# Patient Record
Sex: MALE | Race: WHITE | Employment: UNEMPLOYED | URBAN - METROPOLITAN AREA
[De-identification: names, ages, dates, MRNs, and addresses within clinical notes are randomized per-mention and may not be internally consistent; named-entity substitution may affect disease eponyms.]

---

## 2017-10-08 ENCOUNTER — HOSPITAL ENCOUNTER (EMERGENCY)
Age: 32
Discharge: HOME | End: 2017-10-09
Attending: EMERGENCY MEDICINE

## 2017-10-08 ENCOUNTER — APPOINTMENT (OUTPATIENT)
Dept: GENERAL RADIOLOGY | Age: 32
End: 2017-10-08

## 2017-10-08 DIAGNOSIS — S61.219A LACERATION OF FINGER, INITIAL ENCOUNTER: Primary | ICD-10-CM

## 2017-10-08 PROCEDURE — 99283 EMERGENCY DEPT VISIT LOW MDM: CPT

## 2017-10-08 PROCEDURE — 90471 IMMUNIZATION ADMIN: CPT | Performed by: EMERGENCY MEDICINE

## 2017-10-08 PROCEDURE — 73140 X-RAY EXAM OF FINGER(S): CPT

## 2017-10-08 PROCEDURE — 6360000002 HC RX W HCPCS: Performed by: EMERGENCY MEDICINE

## 2017-10-08 PROCEDURE — 2500000003 HC RX 250 WO HCPCS: Performed by: EMERGENCY MEDICINE

## 2017-10-08 PROCEDURE — 12001 RPR S/N/AX/GEN/TRNK 2.5CM/<: CPT

## 2017-10-08 PROCEDURE — 90715 TDAP VACCINE 7 YRS/> IM: CPT | Performed by: EMERGENCY MEDICINE

## 2017-10-08 RX ORDER — LIDOCAINE HYDROCHLORIDE 10 MG/ML
20 INJECTION, SOLUTION INFILTRATION; PERINEURAL ONCE
Status: COMPLETED | OUTPATIENT
Start: 2017-10-08 | End: 2017-10-08

## 2017-10-08 RX ADMIN — TETANUS TOXOID, REDUCED DIPHTHERIA TOXOID AND ACELLULAR PERTUSSIS VACCINE, ADSORBED 0.5 ML: 5; 2.5; 8; 8; 2.5 SUSPENSION INTRAMUSCULAR at 23:48

## 2017-10-08 RX ADMIN — LIDOCAINE HYDROCHLORIDE 20 ML: 10 INJECTION, SOLUTION INFILTRATION; PERINEURAL at 23:49

## 2017-10-08 ASSESSMENT — PAIN DESCRIPTION - ORIENTATION: ORIENTATION: LEFT

## 2017-10-08 ASSESSMENT — PAIN DESCRIPTION - LOCATION: LOCATION: FINGER (COMMENT WHICH ONE)

## 2017-10-08 ASSESSMENT — PAIN DESCRIPTION - PAIN TYPE: TYPE: ACUTE PAIN

## 2017-10-08 ASSESSMENT — PAIN SCALES - GENERAL: PAINLEVEL_OUTOF10: 2

## 2017-10-09 VITALS
WEIGHT: 135 LBS | SYSTOLIC BLOOD PRESSURE: 119 MMHG | RESPIRATION RATE: 12 BRPM | TEMPERATURE: 98.1 F | HEART RATE: 82 BPM | OXYGEN SATURATION: 95 % | DIASTOLIC BLOOD PRESSURE: 79 MMHG

## 2017-10-09 ASSESSMENT — ENCOUNTER SYMPTOMS
SHORTNESS OF BREATH: 0
COUGH: 0
ABDOMINAL PAIN: 0
NAUSEA: 0
SORE THROAT: 0
VOMITING: 0

## 2017-10-09 NOTE — ED PROVIDER NOTES
call the primary care provider listed on their discharge instructions or a physician of their choice this week to arrange follow up for further evaluation of possible pre-hypertension or Hypertension. (Please note that portions of this note were completed with a voice recognition program.  Efforts were made to edit the dictations but occasionally words are mis-transcribed. )    Robby Calabrese MD  Attending Emergency Medicine Physician              Venecia Esparza MD  10/08/17 0272

## 2017-10-09 NOTE — ED PROVIDER NOTES
FACULTY SIGN-OUT  ADDENDUM     Care of this patient was assumed from Dr Yonas Cohen. The patient was seen for Laceration (laceration to left third digit, pt. was cleaning tile and knife slipped.  )  . The patient's initial evaluation and plan have been discussed with the prior provider who initially evaluated the patient. Nursing Notes, Past Medical Hx, Past Surgical Hx, Social Hx, Allergies, and Family Hx were all reviewed. ED COURSE      The patient was given the following medications:  Orders Placed This Encounter   Medications    Tetanus-Diphth-Acell Pertussis (BOOSTRIX) injection 0.5 mL    lidocaine 1 % injection 20 mL       RECENT VITALS:   Temp: 98.1 °F (36.7 °C), Pulse: 82,  , BP: 119/79    MEDICAL DECISION MAKING       Pt with lac. Sutured. D/c.       Harriet Gallo MD  Emergency Medicine Attending       Lela Bunn MD  10/09/17 0158

## 2017-10-09 NOTE — ED PROVIDER NOTES
[facility]  ED  Emergency Department Encounter  Emergency Medicine Resident     Pt Name: Breanna Cobos  MRN: [id number]  [place] 1985  Date of evaluation: 10/9/17  PCP:  No primary care provider on file. CHIEF COMPLAINT       Chief Complaint   Patient presents with    Laceration     laceration to left third digit, pt. was cleaning tile and knife slipped. HISTORY OF PRESENT ILLNESS  (Location/Symptom, Timing/Onset, Context/Setting, Quality, Duration, Modifying Factors, Severity.)      Breanna Cobos is a 27 y.o. male who presents with Laceration to the medial aspect of his left middle finger. States that about 3:00 this afternoon, he cut his finger with a knife while fixing tile. States that he presents at this hour due to uncontrolled bleeding. Subsequently any blood thinners. Cannot recall when his last tetanus shot was administered. He is right-hand dominant. Denies any fever, chills, nausea or vomiting. Denies any other constitutional symptoms. Denies any weakness, numbness or tingling to his upper or lower extremities. PAST MEDICAL / SURGICAL / SOCIAL / FAMILY HISTORY      has no past medical history on file. has no past surgical history on file. Social History     Social History    Marital status: Single     Spouse name: N/A    Number of children: N/A    Years of education: N/A     Occupational History    Not on file. Social History Main Topics    Smoking status: Current Every Day Smoker     Packs/day: 1.00     Types: Cigarettes    Smokeless tobacco: Never Used    Alcohol use No    Drug use:      Types: Marijuana    Sexual activity: Not on file     Other Topics Concern    Not on file     Social History Narrative    No narrative on file       History reviewed. No pertinent family history. Allergies:  Review of patient's allergies indicates no known allergies.     Home Medications:  Prior to Admission medications    Not on File REVIEW OF SYSTEMS    (2-9 systems for level 4, 10 or more for level 5)      Review of Systems   Constitutional: Negative for chills and fever. HENT: Negative for sore throat. Eyes: Negative for visual disturbance. Respiratory: Negative for cough and shortness of breath. Cardiovascular: Negative for chest pain. Gastrointestinal: Negative for abdominal pain, nausea and vomiting. Genitourinary: Negative for difficulty urinating. Musculoskeletal: Negative for arthralgias and myalgias. Skin: Positive for wound. Neurological: Negative for weakness, numbness and headaches. Psychiatric/Behavioral: Negative for behavioral problems. PHYSICAL EXAM   (up to 7 for level 4, 8 or more for level 5)      INITIAL VITALS:   /79   Pulse 82   Temp 98.1 °F (36.7 °C) (Oral)   Resp 12   Wt 135 lb (61.2 kg)   SpO2 95%     Physical Exam   Constitutional: He is oriented to person, place, and time. He appears well-developed and well-nourished. No distress. HENT:   Head: Normocephalic and atraumatic. Eyes: EOM are normal. Pupils are equal, round, and reactive to light. Neck: Normal range of motion. Musculoskeletal: Normal range of motion. Neurological: He is alert and oriented to person, place, and time. He has normal strength. No sensory deficit. GCS eye subscore is 4. GCS verbal subscore is 5. GCS motor subscore is 6. Skin:   1.5 cm linear laceration to medial aspect of left middle finger along the length of the DIP   Psychiatric: His behavior is normal.       DIFFERENTIAL  DIAGNOSIS     PLAN (LABS / IMAGING / EKG):  Orders Placed This Encounter   Procedures    XR FINGER LEFT (MIN 2 VIEWS)       MEDICATIONS ORDERED:  Orders Placed This Encounter   Medications    Tetanus-Diphth-Acell Pertussis (BOOSTRIX) injection 0.5 mL    lidocaine 1 % injection 20 mL       DIAGNOSTIC RESULTS / EMERGENCY DEPARTMENT COURSE / MDM     LABS:  No results found for this visit on 10/08/17.     IMPRESSION: 1.5 cm laceration along the medial aspect of the left middle finger. Patient afebrile and hemodynamically stable. He is healthy and nontoxic in appearance. Given the injury, we'll plan to irrigate, obtain a plain film, administer some anesthetic and suture the laceration. We'll also update his tetanus. RADIOLOGY:  Xr Finger Left (min 2 Views)    Result Date: 10/8/2017  EXAMINATION: 3 VIEWS OF THE LEFT FINGERS 10/8/2017 11:33 pm COMPARISON: None. HISTORY: ORDERING SYSTEM PROVIDED HISTORY: middle finger with laceration; r/o joint involvement TECHNOLOGIST PROVIDED HISTORY: Reason for exam:->middle finger with laceration; r/o joint involvement FINDINGS: There is a tiny age indeterminate avulsion fracture at the base of the proximal phalanx 3rd digit, radial aspect. Remainder of osseous structures appear intact. There is normal alignment. No acute joint abnormality. No focal osseous lesion. No focal soft tissue abnormality. Tiny age indeterminate avulsion fracture as described. EKG  None    All EKG's are interpreted by the Emergency Department Physician who either signs or Co-signs this chart in the absence of a cardiologist.    EMERGENCY DEPARTMENT COURSE:  Patient tolerated the laceration repair without difficulty. Wound was thoroughly irrigated and inspected. Plain film was negative for any foreign body or joint involvement. Nerve block was administered to the middle finger of the left hand. 3 4-0 Ethilon sutures were applied. Patient tolerated the procedure well. Bacitracin was applied with a Band-Aid. Patient was advised to keep the wound clean with water and soap. Recommend he applied bacitracin daily. Advised him to either return to the ED or follow-up with his PCP in 7-10 days for reevaluation. Patient was advised to return to the ED if he noticed any signs of infection. Patient demonstrated his understanding and is agreeable with the plan.   He is stable for discharge. PROCEDURES:  Laceration Repair Procedure Note    Indication: Laceration    Procedure: The patient was placed in the appropriate position and anesthesia around the laceration was obtained with a full digital block of the left long (middle) finger using 3.0 cc of 1% Lidocaine without epinephrine. The area was then cleansed using Chloraprep, irrigated with normal saline and explored with no foreign bodies discovered. The laceration was closed with 4-0 Ethilon using interrupted sutures. There were no additional lacerations requiring repair. The wound area was then dressed with bacitracin and a bandage. Total repaired wound length: 1.5 cm. Other Items: Suture count: 3    The patient tolerated the procedure well. Complications: None        CONSULTS:  None    CRITICAL CARE:  None    FINAL IMPRESSION      1. Laceration of finger, initial encounter          DISPOSITION / PLAN     DISPOSITION Decision to Discharge    PATIENT REFERRED TO:  [facility]  [address]  In 1 week  For suture removal      DISCHARGE MEDICATIONS:  There are no discharge medications for this patient.       Merlinda Lapidus, MD  Emergency Medicine Resident    (Please note that portions of this note were completed with a voice recognition program.  Efforts were made to edit the dictations but occasionally words are mis-transcribed.)       Merlinda Lapidus, MD  Resident  10/09/17 0140